# Patient Record
Sex: MALE | Race: WHITE | NOT HISPANIC OR LATINO | URBAN - METROPOLITAN AREA
[De-identification: names, ages, dates, MRNs, and addresses within clinical notes are randomized per-mention and may not be internally consistent; named-entity substitution may affect disease eponyms.]

---

## 2017-04-18 ENCOUNTER — ON CAMPUS - OUTPATIENT (AMBULATORY)
Dept: URBAN - METROPOLITAN AREA HOSPITAL 2 | Facility: HOSPITAL | Age: 72
End: 2017-04-18
Payer: MEDICARE

## 2017-04-18 ENCOUNTER — HOSPITAL ENCOUNTER (OUTPATIENT)
Dept: OTHER | Facility: HOSPITAL | Age: 72
Setting detail: SPECIMEN
Discharge: HOME | End: 2017-04-18
Attending: INTERNAL MEDICINE | Admitting: INTERNAL MEDICINE

## 2017-04-18 ENCOUNTER — OFFICE (AMBULATORY)
Dept: URBAN - METROPOLITAN AREA CLINIC 64 | Facility: CLINIC | Age: 72
End: 2017-04-18
Payer: MEDICARE

## 2017-04-18 VITALS
HEART RATE: 99 BPM | OXYGEN SATURATION: 98 % | RESPIRATION RATE: 15 BRPM | SYSTOLIC BLOOD PRESSURE: 95 MMHG | HEART RATE: 84 BPM | HEART RATE: 86 BPM | SYSTOLIC BLOOD PRESSURE: 127 MMHG | OXYGEN SATURATION: 96 % | DIASTOLIC BLOOD PRESSURE: 62 MMHG | OXYGEN SATURATION: 99 % | DIASTOLIC BLOOD PRESSURE: 78 MMHG | HEART RATE: 90 BPM | HEART RATE: 82 BPM | SYSTOLIC BLOOD PRESSURE: 143 MMHG | DIASTOLIC BLOOD PRESSURE: 71 MMHG | TEMPERATURE: 97 F | OXYGEN SATURATION: 95 % | DIASTOLIC BLOOD PRESSURE: 74 MMHG | HEART RATE: 101 BPM | HEART RATE: 93 BPM | DIASTOLIC BLOOD PRESSURE: 97 MMHG | DIASTOLIC BLOOD PRESSURE: 93 MMHG | OXYGEN SATURATION: 94 % | OXYGEN SATURATION: 97 % | SYSTOLIC BLOOD PRESSURE: 109 MMHG | SYSTOLIC BLOOD PRESSURE: 102 MMHG | RESPIRATION RATE: 18 BRPM | HEART RATE: 104 BPM | SYSTOLIC BLOOD PRESSURE: 136 MMHG | SYSTOLIC BLOOD PRESSURE: 113 MMHG | HEART RATE: 105 BPM | HEART RATE: 85 BPM | SYSTOLIC BLOOD PRESSURE: 98 MMHG | WEIGHT: 178 LBS | DIASTOLIC BLOOD PRESSURE: 67 MMHG | DIASTOLIC BLOOD PRESSURE: 39 MMHG | HEIGHT: 71 IN | SYSTOLIC BLOOD PRESSURE: 88 MMHG | HEART RATE: 100 BPM | OXYGEN SATURATION: 90 % | RESPIRATION RATE: 16 BRPM | DIASTOLIC BLOOD PRESSURE: 63 MMHG | HEART RATE: 117 BPM | SYSTOLIC BLOOD PRESSURE: 105 MMHG | DIASTOLIC BLOOD PRESSURE: 68 MMHG | SYSTOLIC BLOOD PRESSURE: 111 MMHG

## 2017-04-18 DIAGNOSIS — D12.5 BENIGN NEOPLASM OF SIGMOID COLON: ICD-10-CM

## 2017-04-18 DIAGNOSIS — D12.0 BENIGN NEOPLASM OF CECUM: ICD-10-CM

## 2017-04-18 DIAGNOSIS — D12.3 BENIGN NEOPLASM OF TRANSVERSE COLON: ICD-10-CM

## 2017-04-18 DIAGNOSIS — D12.2 BENIGN NEOPLASM OF ASCENDING COLON: ICD-10-CM

## 2017-04-18 DIAGNOSIS — Z86.010 PERSONAL HISTORY OF COLONIC POLYPS: ICD-10-CM

## 2017-04-18 LAB
GI HISTOLOGY: A. UNSPECIFIED: (no result)
GI HISTOLOGY: B. UNSPECIFIED: (no result)
GI HISTOLOGY: C. UNSPECIFIED: (no result)
GI HISTOLOGY: D. UNSPECIFIED: (no result)
GI HISTOLOGY: PDF REPORT: (no result)

## 2017-04-18 PROCEDURE — 45385 COLONOSCOPY W/LESION REMOVAL: CPT | Mod: PT | Performed by: INTERNAL MEDICINE

## 2017-04-18 PROCEDURE — 88305 TISSUE EXAM BY PATHOLOGIST: CPT | Mod: 26 | Performed by: INTERNAL MEDICINE

## 2017-04-18 RX ORDER — CALCIUM CARBONATE 500(1250)
500 TABLET ORAL
Qty: 30 | Refills: 1 | Status: COMPLETED
Start: 2017-04-18 | End: 2018-05-07

## 2017-04-18 RX ORDER — ASPIRIN 81 MG/1
81 TABLET, DELAYED RELEASE ORAL
Qty: 90 | Refills: 3 | Status: COMPLETED
Start: 2017-04-18 | End: 2018-05-07

## 2017-04-18 RX ADMIN — PROPOFOL: 10 INJECTION, EMULSION INTRAVENOUS at 07:47

## 2018-05-07 ENCOUNTER — HOSPITAL ENCOUNTER (OUTPATIENT)
Dept: OTHER | Facility: HOSPITAL | Age: 73
Setting detail: SPECIMEN
Discharge: HOME | End: 2018-05-07
Attending: INTERNAL MEDICINE | Admitting: INTERNAL MEDICINE

## 2018-05-07 ENCOUNTER — ON CAMPUS - OUTPATIENT (AMBULATORY)
Dept: URBAN - METROPOLITAN AREA HOSPITAL 2 | Facility: HOSPITAL | Age: 73
End: 2018-05-07
Payer: MEDICARE

## 2018-05-07 ENCOUNTER — OFFICE (AMBULATORY)
Dept: URBAN - METROPOLITAN AREA PATHOLOGY 4 | Facility: PATHOLOGY | Age: 73
End: 2018-05-07
Payer: MEDICARE

## 2018-05-07 VITALS
DIASTOLIC BLOOD PRESSURE: 56 MMHG | OXYGEN SATURATION: 95 % | WEIGHT: 181 LBS | HEART RATE: 97 BPM | SYSTOLIC BLOOD PRESSURE: 119 MMHG | HEART RATE: 100 BPM | SYSTOLIC BLOOD PRESSURE: 110 MMHG | HEART RATE: 98 BPM | RESPIRATION RATE: 16 BRPM | SYSTOLIC BLOOD PRESSURE: 86 MMHG | SYSTOLIC BLOOD PRESSURE: 116 MMHG | DIASTOLIC BLOOD PRESSURE: 93 MMHG | HEIGHT: 71 IN | RESPIRATION RATE: 18 BRPM | DIASTOLIC BLOOD PRESSURE: 53 MMHG | OXYGEN SATURATION: 97 % | DIASTOLIC BLOOD PRESSURE: 55 MMHG | HEART RATE: 90 BPM | SYSTOLIC BLOOD PRESSURE: 144 MMHG | SYSTOLIC BLOOD PRESSURE: 126 MMHG | HEART RATE: 92 BPM | RESPIRATION RATE: 15 BRPM | SYSTOLIC BLOOD PRESSURE: 84 MMHG | OXYGEN SATURATION: 96 % | TEMPERATURE: 97.2 F | DIASTOLIC BLOOD PRESSURE: 83 MMHG | OXYGEN SATURATION: 94 % | DIASTOLIC BLOOD PRESSURE: 92 MMHG | HEART RATE: 96 BPM | HEART RATE: 104 BPM | OXYGEN SATURATION: 92 % | DIASTOLIC BLOOD PRESSURE: 58 MMHG | DIASTOLIC BLOOD PRESSURE: 78 MMHG | DIASTOLIC BLOOD PRESSURE: 50 MMHG | HEART RATE: 89 BPM | SYSTOLIC BLOOD PRESSURE: 77 MMHG | HEART RATE: 111 BPM

## 2018-05-07 DIAGNOSIS — D12.3 BENIGN NEOPLASM OF TRANSVERSE COLON: ICD-10-CM

## 2018-05-07 DIAGNOSIS — D12.2 BENIGN NEOPLASM OF ASCENDING COLON: ICD-10-CM

## 2018-05-07 DIAGNOSIS — Z86.010 PERSONAL HISTORY OF COLONIC POLYPS: ICD-10-CM

## 2018-05-07 DIAGNOSIS — D12.5 BENIGN NEOPLASM OF SIGMOID COLON: ICD-10-CM

## 2018-05-07 DIAGNOSIS — K57.30 DIVERTICULOSIS OF LARGE INTESTINE WITHOUT PERFORATION OR ABS: ICD-10-CM

## 2018-05-07 LAB
GI HISTOLOGY: A. UNSPECIFIED: (no result)
GI HISTOLOGY: B. UNSPECIFIED: (no result)
GI HISTOLOGY: C. UNSPECIFIED: (no result)
GI HISTOLOGY: PDF REPORT: (no result)

## 2018-05-07 PROCEDURE — 45385 COLONOSCOPY W/LESION REMOVAL: CPT | Mod: PT | Performed by: INTERNAL MEDICINE

## 2018-05-07 PROCEDURE — 88305 TISSUE EXAM BY PATHOLOGIST: CPT | Mod: 26 | Performed by: INTERNAL MEDICINE

## 2018-05-07 RX ADMIN — PROPOFOL: 10 INJECTION, EMULSION INTRAVENOUS at 08:10

## 2018-05-07 NOTE — SERVICEHPINOTES
MEREDITH MENA  is a  73  male   who presents today for a  Colonoscopy   for   the indications listed below. The updated Patient Profile was reviewed prior to the procedure, in conjunction with the Physical Exam, including medical conditions, surgical procedures, medications, allergies, family history and social history. See Physical Exam time stamp below for date and time of HPI completion.Pre-operatively, I reviewed the indication(s) for the procedure, the risks of the procedure [including but not limited to: unexpected bleeding possibly requiring hospitalization and/or unplanned repeat procedures, perforation possibly requiring surgical treatment, missed lesions and complications of sedation/MAC (also explained by anesthesia staff)]. I have evaluated the patient for risks associated with the planned anesthesia and the procedure to be performed and find the patient an acceptable candidate for IV sedation.Multiple opportunities were provided for any questions or concerns, and all questions were answered satisfactorily before any anesthesia was administered. We will proceed with the planned procedure.BR

## 2020-05-08 ENCOUNTER — ON CAMPUS - OUTPATIENT (AMBULATORY)
Dept: URBAN - METROPOLITAN AREA HOSPITAL 2 | Facility: HOSPITAL | Age: 75
End: 2020-05-08
Payer: MEDICARE

## 2020-05-08 ENCOUNTER — OFFICE (AMBULATORY)
Dept: URBAN - METROPOLITAN AREA PATHOLOGY 4 | Facility: PATHOLOGY | Age: 75
End: 2020-05-08
Payer: COMMERCIAL

## 2020-05-08 ENCOUNTER — OFFICE (AMBULATORY)
Dept: URBAN - METROPOLITAN AREA PATHOLOGY 4 | Facility: PATHOLOGY | Age: 75
End: 2020-05-08
Payer: MEDICARE

## 2020-05-08 VITALS
DIASTOLIC BLOOD PRESSURE: 72 MMHG | HEART RATE: 82 BPM | SYSTOLIC BLOOD PRESSURE: 148 MMHG | DIASTOLIC BLOOD PRESSURE: 56 MMHG | SYSTOLIC BLOOD PRESSURE: 90 MMHG | HEART RATE: 76 BPM | DIASTOLIC BLOOD PRESSURE: 71 MMHG | HEART RATE: 81 BPM | OXYGEN SATURATION: 95 % | DIASTOLIC BLOOD PRESSURE: 60 MMHG | SYSTOLIC BLOOD PRESSURE: 107 MMHG | HEART RATE: 92 BPM | OXYGEN SATURATION: 98 % | HEART RATE: 97 BPM | HEART RATE: 90 BPM | RESPIRATION RATE: 18 BRPM | SYSTOLIC BLOOD PRESSURE: 103 MMHG | TEMPERATURE: 97.9 F | OXYGEN SATURATION: 96 % | RESPIRATION RATE: 16 BRPM | SYSTOLIC BLOOD PRESSURE: 142 MMHG | WEIGHT: 169.9 LBS | DIASTOLIC BLOOD PRESSURE: 98 MMHG | DIASTOLIC BLOOD PRESSURE: 96 MMHG | DIASTOLIC BLOOD PRESSURE: 57 MMHG | HEIGHT: 71 IN | OXYGEN SATURATION: 97 % | SYSTOLIC BLOOD PRESSURE: 86 MMHG | SYSTOLIC BLOOD PRESSURE: 95 MMHG

## 2020-05-08 DIAGNOSIS — Z86.010 PERSONAL HISTORY OF COLONIC POLYPS: ICD-10-CM

## 2020-05-08 DIAGNOSIS — K63.5 POLYP OF COLON: ICD-10-CM

## 2020-05-08 DIAGNOSIS — K57.30 DIVERTICULOSIS OF LARGE INTESTINE WITHOUT PERFORATION OR ABS: ICD-10-CM

## 2020-05-08 LAB
GI HISTOLOGY: A. UNSPECIFIED: (no result)
GI HISTOLOGY: PDF REPORT: (no result)

## 2020-05-08 PROCEDURE — 45385 COLONOSCOPY W/LESION REMOVAL: CPT | Mod: PT | Performed by: INTERNAL MEDICINE

## 2020-05-08 PROCEDURE — 88305 TISSUE EXAM BY PATHOLOGIST: CPT | Mod: 26 | Performed by: INTERNAL MEDICINE

## 2021-03-17 ENCOUNTER — TELEPHONE (OUTPATIENT)
Dept: CARDIOLOGY | Facility: CLINIC | Age: 76
End: 2021-03-17

## 2021-03-17 NOTE — TELEPHONE ENCOUNTER
SPOKE TO SHANNON GILES'S OFFICE. SHE IS WORKING ON [employer] FOR INSURANCE.  PATIENT HAS [employer] O. SHE WILL CALL BACK ONCE SHE HAS IT SO WE CAN MAKE APT FOR PATIENT.

## 2021-04-22 ENCOUNTER — OFFICE VISIT (OUTPATIENT)
Dept: CARDIOLOGY | Facility: CLINIC | Age: 76
End: 2021-04-22

## 2021-04-22 VITALS
HEART RATE: 90 BPM | BODY MASS INDEX: 25.2 KG/M2 | HEIGHT: 70 IN | DIASTOLIC BLOOD PRESSURE: 70 MMHG | WEIGHT: 176 LBS | SYSTOLIC BLOOD PRESSURE: 140 MMHG

## 2021-04-22 DIAGNOSIS — Z86.718 HISTORY OF DVT (DEEP VEIN THROMBOSIS): ICD-10-CM

## 2021-04-22 DIAGNOSIS — R00.2 PALPITATIONS: Primary | ICD-10-CM

## 2021-04-22 DIAGNOSIS — R01.1 HEART MURMUR: ICD-10-CM

## 2021-04-22 PROCEDURE — 99204 OFFICE O/P NEW MOD 45 MIN: CPT | Performed by: INTERNAL MEDICINE

## 2021-04-22 PROCEDURE — 93000 ELECTROCARDIOGRAM COMPLETE: CPT | Performed by: INTERNAL MEDICINE

## 2021-04-22 RX ORDER — ALLOPURINOL 100 MG/1
100 TABLET ORAL DAILY
COMMUNITY
Start: 2021-03-31

## 2021-04-22 RX ORDER — MULTIPLE VITAMINS W/ MINERALS TAB 9MG-400MCG
1 TAB ORAL DAILY
COMMUNITY
End: 2021-07-19

## 2021-04-22 RX ORDER — ASPIRIN 81 MG/1
81 TABLET ORAL DAILY
COMMUNITY
End: 2021-07-19

## 2021-05-19 ENCOUNTER — HOSPITAL ENCOUNTER (OUTPATIENT)
Dept: CARDIOLOGY | Facility: HOSPITAL | Age: 76
Discharge: HOME | End: 2021-05-19
Admitting: INTERNAL MEDICINE

## 2021-05-19 VITALS
BODY MASS INDEX: 25.2 KG/M2 | WEIGHT: 176 LBS | HEIGHT: 70 IN | SYSTOLIC BLOOD PRESSURE: 140 MMHG | OXYGEN SATURATION: 95 % | DIASTOLIC BLOOD PRESSURE: 84 MMHG | HEART RATE: 89 BPM

## 2021-05-19 DIAGNOSIS — R00.2 PALPITATIONS: ICD-10-CM

## 2021-05-19 DIAGNOSIS — R01.1 HEART MURMUR: ICD-10-CM

## 2021-05-19 PROCEDURE — 93306 TTE W/DOPPLER COMPLETE: CPT

## 2021-05-19 PROCEDURE — 93306 TTE W/DOPPLER COMPLETE: CPT | Performed by: INTERNAL MEDICINE

## 2021-05-20 LAB
AORTIC ARCH: 2.9 CM
AORTIC DIMENSIONLESS INDEX: 0.4 (DI)
ASCENDING AORTA: 3.6 CM
BH CV ECHO MEAS - ACS: 1.2 CM
BH CV ECHO MEAS - AO MAX PG (FULL): 24.3 MMHG
BH CV ECHO MEAS - AO MAX PG: 28.2 MMHG
BH CV ECHO MEAS - AO MEAN PG (FULL): 12.5 MMHG
BH CV ECHO MEAS - AO MEAN PG: 14.9 MMHG
BH CV ECHO MEAS - AO ROOT AREA (BSA CORRECTED): 1.6
BH CV ECHO MEAS - AO ROOT AREA: 8.2 CM^2
BH CV ECHO MEAS - AO ROOT DIAM: 3.2 CM
BH CV ECHO MEAS - AO V2 MAX: 265.4 CM/SEC
BH CV ECHO MEAS - AO V2 MEAN: 188.9 CM/SEC
BH CV ECHO MEAS - AO V2 VTI: 54.8 CM
BH CV ECHO MEAS - ASC AORTA: 3.6 CM
BH CV ECHO MEAS - AVA(I,A): 1.8 CM^2
BH CV ECHO MEAS - AVA(I,D): 1.8 CM^2
BH CV ECHO MEAS - AVA(V,A): 1.7 CM^2
BH CV ECHO MEAS - AVA(V,D): 1.7 CM^2
BH CV ECHO MEAS - BSA(HAYCOCK): 2 M^2
BH CV ECHO MEAS - BSA: 2 M^2
BH CV ECHO MEAS - BZI_BMI: 25.3 KILOGRAMS/M^2
BH CV ECHO MEAS - BZI_METRIC_HEIGHT: 177.8 CM
BH CV ECHO MEAS - BZI_METRIC_WEIGHT: 79.8 KG
BH CV ECHO MEAS - EDV(MOD-SP2): 60 ML
BH CV ECHO MEAS - EDV(MOD-SP4): 70 ML
BH CV ECHO MEAS - EDV(TEICH): 72.3 ML
BH CV ECHO MEAS - EF(CUBED): 77.7 %
BH CV ECHO MEAS - EF(MOD-BP): 63.7 %
BH CV ECHO MEAS - EF(MOD-SP2): 61.7 %
BH CV ECHO MEAS - EF(MOD-SP4): 64.3 %
BH CV ECHO MEAS - EF(TEICH): 70.4 %
BH CV ECHO MEAS - ESV(MOD-SP2): 23 ML
BH CV ECHO MEAS - ESV(MOD-SP4): 25 ML
BH CV ECHO MEAS - ESV(TEICH): 21.4 ML
BH CV ECHO MEAS - FS: 39.4 %
BH CV ECHO MEAS - IVS/LVPW: 0.95
BH CV ECHO MEAS - IVSD: 1 CM
BH CV ECHO MEAS - LAT PEAK E' VEL: 6 CM/SEC
BH CV ECHO MEAS - LV DIASTOLIC VOL/BSA (35-75): 35.4 ML/M^2
BH CV ECHO MEAS - LV MASS(C)D: 133.8 GRAMS
BH CV ECHO MEAS - LV MASS(C)DI: 67.7 GRAMS/M^2
BH CV ECHO MEAS - LV MAX PG: 3.9 MMHG
BH CV ECHO MEAS - LV MEAN PG: 2.4 MMHG
BH CV ECHO MEAS - LV SYSTOLIC VOL/BSA (12-30): 12.6 ML/M^2
BH CV ECHO MEAS - LV V1 MAX: 98.5 CM/SEC
BH CV ECHO MEAS - LV V1 MEAN: 72.4 CM/SEC
BH CV ECHO MEAS - LV V1 VTI: 21.4 CM
BH CV ECHO MEAS - LVIDD: 4.1 CM
BH CV ECHO MEAS - LVIDS: 2.5 CM
BH CV ECHO MEAS - LVLD AP2: 6.1 CM
BH CV ECHO MEAS - LVLD AP4: 6.7 CM
BH CV ECHO MEAS - LVLS AP2: 5.5 CM
BH CV ECHO MEAS - LVLS AP4: 5.5 CM
BH CV ECHO MEAS - LVOT AREA (M): 4.5 CM^2
BH CV ECHO MEAS - LVOT AREA: 4.5 CM^2
BH CV ECHO MEAS - LVOT DIAM: 2.4 CM
BH CV ECHO MEAS - LVPWD: 1 CM
BH CV ECHO MEAS - MED PEAK E' VEL: 6.4 CM/SEC
BH CV ECHO MEAS - MV A DUR: 0.09 SEC
BH CV ECHO MEAS - MV A MAX VEL: 72.2 CM/SEC
BH CV ECHO MEAS - MV DEC SLOPE: 469.2 CM/SEC^2
BH CV ECHO MEAS - MV DEC TIME: 0.2 SEC
BH CV ECHO MEAS - MV E MAX VEL: 94.6 CM/SEC
BH CV ECHO MEAS - MV E/A: 1.3
BH CV ECHO MEAS - MV MAX PG: 3.3 MMHG
BH CV ECHO MEAS - MV MEAN PG: 2 MMHG
BH CV ECHO MEAS - MV P1/2T MAX VEL: 91.1 CM/SEC
BH CV ECHO MEAS - MV P1/2T: 56.9 MSEC
BH CV ECHO MEAS - MV V2 MAX: 91.1 CM/SEC
BH CV ECHO MEAS - MV V2 MEAN: 68.1 CM/SEC
BH CV ECHO MEAS - MV V2 VTI: 25.9 CM
BH CV ECHO MEAS - MVA P1/2T LCG: 2.4 CM^2
BH CV ECHO MEAS - MVA(P1/2T): 3.9 CM^2
BH CV ECHO MEAS - MVA(VTI): 3.7 CM^2
BH CV ECHO MEAS - PA ACC TIME: 0.07 SEC
BH CV ECHO MEAS - PA MAX PG (FULL): 4.8 MMHG
BH CV ECHO MEAS - PA MAX PG: 6.7 MMHG
BH CV ECHO MEAS - PA PR(ACCEL): 45.7 MMHG
BH CV ECHO MEAS - PA V2 MAX: 129 CM/SEC
BH CV ECHO MEAS - PULM A REVS DUR: 0.09 SEC
BH CV ECHO MEAS - PULM A REVS VEL: 32.6 CM/SEC
BH CV ECHO MEAS - PULM DIAS VEL: 54.8 CM/SEC
BH CV ECHO MEAS - PULM S/D: 1.1
BH CV ECHO MEAS - PULM SYS VEL: 60.8 CM/SEC
BH CV ECHO MEAS - PVA(V,A): 1.8 CM^2
BH CV ECHO MEAS - PVA(V,D): 1.8 CM^2
BH CV ECHO MEAS - QP/QS: 0.62
BH CV ECHO MEAS - RAP SYSTOLE: 3 MMHG
BH CV ECHO MEAS - RV MAX PG: 1.9 MMHG
BH CV ECHO MEAS - RV MEAN PG: 1.3 MMHG
BH CV ECHO MEAS - RV V1 MAX: 68.6 CM/SEC
BH CV ECHO MEAS - RV V1 MEAN: 54.4 CM/SEC
BH CV ECHO MEAS - RV V1 VTI: 17.4 CM
BH CV ECHO MEAS - RVOT AREA: 3.4 CM^2
BH CV ECHO MEAS - RVOT DIAM: 2.1 CM
BH CV ECHO MEAS - RVSP: 23.5 MMHG
BH CV ECHO MEAS - SI(AO): 226.7 ML/M^2
BH CV ECHO MEAS - SI(CUBED): 26.2 ML/M^2
BH CV ECHO MEAS - SI(LVOT): 48.8 ML/M^2
BH CV ECHO MEAS - SI(MOD-SP2): 18.7 ML/M^2
BH CV ECHO MEAS - SI(MOD-SP4): 22.8 ML/M^2
BH CV ECHO MEAS - SI(TEICH): 25.8 ML/M^2
BH CV ECHO MEAS - SV(AO): 448.1 ML
BH CV ECHO MEAS - SV(CUBED): 51.8 ML
BH CV ECHO MEAS - SV(LVOT): 96.4 ML
BH CV ECHO MEAS - SV(MOD-SP2): 37 ML
BH CV ECHO MEAS - SV(MOD-SP4): 45 ML
BH CV ECHO MEAS - SV(RVOT): 60 ML
BH CV ECHO MEAS - SV(TEICH): 50.9 ML
BH CV ECHO MEAS - TAPSE (>1.6): 1.8 CM
BH CV ECHO MEAS - TR MAX VEL: 226.2 CM/SEC
BH CV ECHO MEASUREMENTS AVERAGE E/E' RATIO: 15.26
BH CV VAS BP RIGHT ARM: NORMAL MMHG
BH CV XLRA - RV BASE: 2.4 CM
BH CV XLRA - RV LENGTH: 6.9 CM
BH CV XLRA - RV MID: 2.9 CM
BH CV XLRA - TDI S': 10.7 CM/SEC
LEFT ATRIUM VOLUME INDEX: 33 ML/M2
LV EF 2D ECHO EST: 65 %
MAXIMAL PREDICTED HEART RATE: 145 BPM
SINUS: 3.4 CM
STJ: 3.6 CM
STRESS TARGET HR: 123 BPM

## 2021-05-27 ENCOUNTER — TELEPHONE (OUTPATIENT)
Dept: CARDIOLOGY | Facility: CLINIC | Age: 76
End: 2021-05-27

## 2021-05-27 NOTE — TELEPHONE ENCOUNTER
Called patient no answer left voicemail to let him know that I am sitting some samples up front ready for him to pick. I am only able to give two boxes at  a time.

## 2021-05-27 NOTE — TELEPHONE ENCOUNTER
Called patient and discussed event monitor results which did identify paroxysmal atrial flutter on 5/25/2021.  Patient did have some mild chest discomfort during this time but was not as severe as other palpitations.  This likely explains some of his palpitations.  I have asked him to start metoprolol 25 mg twice daily to have called into the pharmacy and have also called in Eliquis 5 mg twice daily.

## 2021-07-12 ENCOUNTER — OFFICE VISIT (OUTPATIENT)
Dept: ORTHOPEDIC SURGERY | Facility: CLINIC | Age: 76
End: 2021-07-12

## 2021-07-12 VITALS — WEIGHT: 178 LBS | BODY MASS INDEX: 24.92 KG/M2 | HEIGHT: 71 IN | TEMPERATURE: 97.3 F

## 2021-07-12 DIAGNOSIS — M79.671 FOOT PAIN, RIGHT: Primary | ICD-10-CM

## 2021-07-12 DIAGNOSIS — M77.51 CALCANEAL BURSITIS (HEEL), RIGHT: ICD-10-CM

## 2021-07-12 DIAGNOSIS — M19.071 DJD (DEGENERATIVE JOINT DISEASE), ANKLE AND FOOT, RIGHT: ICD-10-CM

## 2021-07-12 PROCEDURE — 73630 X-RAY EXAM OF FOOT: CPT | Performed by: NURSE PRACTITIONER

## 2021-07-12 PROCEDURE — 99202 OFFICE O/P NEW SF 15 MIN: CPT | Performed by: NURSE PRACTITIONER

## 2021-07-12 RX ORDER — COLCHICINE 0.6 MG/1
TABLET ORAL
COMMUNITY
Start: 2021-05-17

## 2021-07-12 NOTE — PROGRESS NOTES
Patient Name: Aniket Ventura   YOB: 1945  Referring Primary Care Physician: Hector Felder MD  BMI: Body mass index is 24.83 kg/m².    Chief Complaint:    Chief Complaint   Patient presents with   • Right Foot - Pain        HPI: New patient to me presents with heel pain.  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  Denies any history of injury or trauma in the past or fracture.    Aniket Ventura is a 76 y.o. male who presents today for evaluation of   Chief Complaint   Patient presents with   • Right Foot - Pain         Subjective   Medications:   Home Medications:  Current Outpatient Medications on File Prior to Visit   Medication Sig   • allopurinol (ZYLOPRIM) 100 MG tablet Take 100 mg by mouth Daily.   • apixaban (ELIQUIS) 5 MG tablet tablet Take 1 tablet by mouth Every 12 (Twelve) Hours.   • metoprolol tartrate (LOPRESSOR) 25 MG tablet Take 1 tablet by mouth 2 (Two) Times a Day.   • aspirin 81 MG EC tablet Take 81 mg by mouth Daily.   • colchicine 0.6 MG tablet PRN gout flare   • Ferrous Sulfate (IRON PO) Take  by mouth.   • multivitamin with minerals tablet tablet Take 1 tablet by mouth Daily.     No current facility-administered medications on file prior to visit.     Current Medications:  Scheduled Meds:  Continuous Infusions:No current facility-administered medications for this visit.    PRN Meds:.    I have reviewed the patient's medical history in detail and updated the computerized patient record.  Review and summarization of old records includes:    History reviewed. No pertinent past medical history.     Past Surgical History:   Procedure Laterality Date   • HERNIA REPAIR      x2        Social History     Occupational History   • Not on file   Tobacco Use   • Smoking status: Former Smoker     Packs/day:  "1.00     Years: 30.00     Pack years: 30.00   • Smokeless tobacco: Former User   • Tobacco comment: quit 25 years ago   Substance and Sexual Activity   • Alcohol use: Not Currently   • Drug use: Not Currently   • Sexual activity: Not on file      Social History     Social History Narrative   • Not on file        Family History   Problem Relation Age of Onset   • Heart disease Father    • Stroke Father    • Diabetes Brother    • Colon cancer Brother        ROS: 14 point review of systems was performed and all other systems were reviewed and are negative except for documented findings in HPI and today's encounter.     Allergies: No Known Allergies  Constitutional:  Denies fever, shaking or chills   Eyes:  Denies change in visual acuity   HENT:  Denies nasal congestion or sore throat   Respiratory:  Denies cough or shortness of breath   Cardiovascular:  Denies chest pain or severe LE edema   GI:  Denies abdominal pain, nausea, vomiting, bloody stools or diarrhea   Musculoskeletal:  Numbness, tingling, pain, or loss of motor function only as noted above in history of present illness.  : Denies painful urination or hematuria  Integument:  Denies rash, lesion or ulceration   Neurologic:  Denies headache or focal weakness  Endocrine:  Denies lymphadenopathy  Psych:  Denies confusion or change in mental status   Hem:  Denies active bleeding    OBJECTIVE:  Physical Exam: 76 y.o. male  Wt Readings from Last 3 Encounters:   07/12/21 80.7 kg (178 lb)   05/19/21 79.8 kg (176 lb)   04/22/21 79.8 kg (176 lb)     Ht Readings from Last 1 Encounters:   07/12/21 180.3 cm (71\")     Body mass index is 24.83 kg/m².  Vitals:    07/12/21 1334   Temp: 97.3 °F (36.3 °C)     Vital signs reviewed.     General Appearance:    Alert, cooperative, in no acute distress                  Eyes: conjunctiva clear  ENT: external ears and nose atraumatic  CV: no peripheral edema  Resp: normal respiratory effort  Skin: no rashes or wounds; normal " turgor  Psych: mood and affect appropriate  Lymph: no nodes appreciated  Neuro: gross sensation intact  Vascular:  Palpable peripheral pulse in noted extremity  Musculoskeletal Extremities: Skin is warm dry intact with good pulses movement and sensation is point tender over the calcaneal bursa compartments are soft skin is intact capillary refill is intact posterior tibial tendon is okay Achilles tendon is intact calf is soft and nontender base of fifth metatarsals nontender to palpation    Radiology:   Right foot 3 views done for pain no comparison films no acute fractures and degenerative changes noted no spurs noted        Assessment:     ICD-10-CM ICD-9-CM   1. Foot pain, right  M79.671 729.5   2. Calcaneal bursitis (heel), right  M77.51 726.79   3. DJD (degenerative joint disease), ankle and foot, right  M19.071 715.97        MDM/Plan:   The diagnosis(es), natural history, pathophysiology and treatment for diagnosis(es) were discussed. Opportunity given and questions answered.  Biomechanics of pertinent body areas discussed.  When appropriate, the use of ambulatory aids discussed.    The diagnosis(es), natural history, pathophysiology and treatment for diagnosis(es) were discussed. Opportunity given and questions answered.  Biomechanics of pertinent body areas discussed.  When appropriate, the use of ambulatory aids discussed.  EXERCISES:  Advice on benefits of, and types of regular/moderate exercise pertaining to orthopedic diagnosis(es).  MEDICATIONS:  The risks, benefits, warnings,side effects and alternatives of medications discussed.  Inflammation/pain control; with cold, heat, elevation and/or liniments discussed as appropriate  HOME EXERCISE/PT program encouraged  MEDICAL RECORDS reviewed from other provider(s) for past and current medical history pertinent to this complaint.  Will immobilize him and see if we get the inflammation and swelling out and have him follow-up with sports medicine or one of our  doctors in [place] if he wants to go to [place]    7/12/2021    Much of this encounter note is an electronic transcription/translation of spoken language to printed text. The electronic translation of spoken language may permit erroneous, or at times, nonsensical words or phrases to be inadvertently transcribed; Although I have reviewed the note for such errors, some may still exist

## 2021-07-19 ENCOUNTER — OFFICE VISIT (OUTPATIENT)
Dept: CARDIOLOGY | Facility: CLINIC | Age: 76
End: 2021-07-19

## 2021-07-19 VITALS
WEIGHT: 174.6 LBS | SYSTOLIC BLOOD PRESSURE: 130 MMHG | HEIGHT: 71 IN | DIASTOLIC BLOOD PRESSURE: 80 MMHG | BODY MASS INDEX: 24.44 KG/M2 | HEART RATE: 67 BPM

## 2021-07-19 DIAGNOSIS — I48.0 PAROXYSMAL ATRIAL FIBRILLATION (HCC): ICD-10-CM

## 2021-07-19 DIAGNOSIS — Z86.718 HISTORY OF DVT (DEEP VEIN THROMBOSIS): ICD-10-CM

## 2021-07-19 DIAGNOSIS — I35.0 NONRHEUMATIC AORTIC VALVE STENOSIS: Primary | ICD-10-CM

## 2021-07-19 PROCEDURE — 93000 ELECTROCARDIOGRAM COMPLETE: CPT | Performed by: INTERNAL MEDICINE

## 2021-07-19 PROCEDURE — 99214 OFFICE O/P EST MOD 30 MIN: CPT | Performed by: INTERNAL MEDICINE

## 2021-07-19 NOTE — PROGRESS NOTES
[facility] Follow Up Office Note     Encounter Date:21  Patient:Aniket Ventura  :1945  MRN:[id number]      Chief Complaint: Follow up Palpitations  Chief Complaint   Patient presents with   • Palpitations     2 month f/u   • Heart Murmur   • H/O DVT     History of Presenting Illness:      Mr. Ventura is a 76 y.o. gentleman with past medical history notable for provoked DVT in the setting of knee surgery who presents to our office for follow up evaluation regarding episodes of palpitations and lightheadedness.  After our last visit we were able to identify paroxysmal atrial flutter on the monitor.  He was initiated on beta-blocker and anticoagulation given high risk of stroke.  He has tolerated initiating both of these medications without any significant side effects.  His only complaint is the cost of Eliquis.    Additionally work-up for his heart murmur identified mild aortic stenoses.    Review of Systems:  Review of Systems   Constitutional: Negative.   HENT: Negative.    Eyes: Negative.    Cardiovascular: Negative.    Respiratory: Negative.    Endocrine: Negative.    Hematologic/Lymphatic: Negative.    Skin: Negative.    Musculoskeletal: Negative.    Gastrointestinal: Negative.    Genitourinary: Negative.    Neurological: Negative.    Psychiatric/Behavioral: Negative.    Allergic/Immunologic: Negative.        Current Outpatient Medications on File Prior to Visit   Medication Sig Dispense Refill   • allopurinol (ZYLOPRIM) 100 MG tablet Take 100 mg by mouth Daily.     • apixaban (ELIQUIS) 5 MG tablet tablet Take 1 tablet by mouth Every 12 (Twelve) Hours. 180 tablet 3   • colchicine 0.6 MG tablet PRN gout flare     • metoprolol tartrate (LOPRESSOR) 25 MG tablet Take 1 tablet by mouth 2 (Two) Times a Day. 180 tablet 3   • [DISCONTINUED] aspirin 81 MG EC tablet Take 81 mg by mouth Daily.     • [DISCONTINUED] Ferrous Sulfate (IRON PO) Take  by mouth.     • [DISCONTINUED] multivitamin with  "minerals tablet tablet Take 1 tablet by mouth Daily.       No current facility-administered medications on file prior to visit.       No Known Allergies    History reviewed. No pertinent past medical history.    Past Surgical History:   Procedure Laterality Date   • HERNIA REPAIR      x2       Social History     Socioeconomic History   • Marital status:      Spouse name: Not on file   • Number of children: Not on file   • Years of education: Not on file   • Highest education level: Not on file   Tobacco Use   • Smoking status: Former Smoker     Packs/day: 1.00     Years: 30.00     Pack years: 30.00   • Smokeless tobacco: Former User   • Tobacco comment: quit 25 years ago   Substance and Sexual Activity   • Alcohol use: Not Currently   • Drug use: Not Currently       Family History   Problem Relation Age of Onset   • Heart disease Father    • Stroke Father    • Diabetes Brother    • Colon cancer Brother        The following portions of the patient's history were reviewed and updated as appropriate: allergies, current medications, past family history, past medical history, past social history, past surgical history and problem list.       Objective:       Vitals:    07/19/21 0755   BP: 130/80   BP Location: Left arm   Patient Position: Sitting   Pulse: 67   Weight: 79.2 kg (174 lb 9.6 oz)   Height: 180.3 cm (71\")     Body mass index is 24.35 kg/m².     Physical Exam:  Constitutional: Well appearing, well developed, no acute distress   HENT: Oropharynx clear and membrane moist  Eyes: Normal conjunctiva, no sclera icterus.  Neck: Supple, no carotid bruit bilaterally.  Cardiovascular: Regular rate and rhythm, Early peaking systolic murmur over the right upper sternal border, No bilateral lower extremity edema.  Pulmonary: Normal respiratory effort, normal lung sounds, no wheezing.  Abdominal: Soft, nontender, no hepatosplenomegaly, liver is non-pulsatile.  Neurological: Alert and orient x 3.   Skin: Warm, dry, no " ecchymosis, no rash.  Psych: Appropriate mood and affect. Normal judgment and insight.      Lab Results   Component Value Date     01/13/2020     05/08/2019    K 4.4 01/13/2020    K 4.5 05/08/2019     01/13/2020     05/08/2019    CO2 22 01/13/2020    CO2 26 05/08/2019    BUN 12 01/13/2020    BUN 13 05/08/2019    CREATININE 0.8 01/13/2020    CREATININE 0.9 05/08/2019    CALCIUM 8.7 01/13/2020    CALCIUM 9.1 05/08/2019    ALBUMIN 3.9 01/13/2020    ALBUMIN 4.3 05/08/2019    BILITOT 0.4 01/13/2020    BILITOT 0.2 05/08/2019    AST 20 01/13/2020    AST 23 05/08/2019    ALT 18 01/13/2020    ALT 22 05/08/2019     Lab Results   Component Value Date    WBC 7.64 08/26/2019    WBC 7.2 05/20/2019    HGB 15.9 08/26/2019    HGB 13.7 05/20/2019    HCT 49.3 08/26/2019    HCT 49.0 08/26/2019    MCV 89.6 08/26/2019    MCV 82.7 05/20/2019     08/26/2019     05/20/2019     Lab Results   Component Value Date    TRIG 70 01/13/2020    TRIG 67 01/16/2019    HDL 35 (L) 01/13/2020    HDL 39 (L) 01/16/2019    LDL 88 01/13/2020     (H) 01/16/2019     No results found for: PROBNP, BNP  No results found for: CKTOTAL, CKMB, CKMBINDEX, TROPONINI, TROPONINT  No results found for: TSH      CMP 1/13/2021:  Sodium 141  Potassium 4.2  Chloride 103  CO2 22  BUN 11  Creatinine 0.95  Alk phos 199  AST 21  ALT 21    CBC 1/12/2021:  WBC 5.8  Hemoglobin 15.2  Hematocrit 48.8  Platelets 279    Lipid panel 1/12/2021:  Total cholesterol 136  Triglycerides 68  HDL 31  LDL 91    TSH 10/9/2020:  1.9      ECG 12 Lead    Date/Time: 7/19/2021 8:18 AM  Performed by: Ramy Stoner MD  Authorized by: Ramy Stoner MD   Comparison: compared with previous ECG from 4/22/2021  Rhythm: sinus rhythm    Clinical impression: normal ECG        2 Week Event Monitor 6/2/2021 with image reviewed by myself:  · An abnormal monitor study.  · Underlying heart rhythm was sinus rhythm  · One episode of atrial flutter  noted.    Echocardiogram 5/19/2021 with images reviewed by myself:  · Estimated left ventricular EF = 65% Left ventricular systolic function is normal.  · Left ventricular diastolic function was normal.  · Mild aortic valve stenosis is present. Aortic valve area is 1.8 cm2. Aortic valve maximum pressure gradient is 28.2 mmHg. Aortic valve mean pressure gradient is 14.9 mmHg.  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 Nonrheumatic aortic valve stenosis  ECG 12 Lead   2. History of DVT (deep vein thrombosis)     3. Paroxysmal atrial fibrillation (CMS/[facility])            Plan:       Mr. Ventura is a 76 y.o. gentleman with past medical history notable for provoked DVT in the setting of knee surgery who presents to our office for scheduled follow up evaluation regarding episodes of palpitations and lightheadedness.  Overall patient is doing well.  No changes needed this time.  We will plan on seeing her back in 6 months.    Paroxysmal atrial flutter:  · Identified on event monitor 6/2021  · Continue anticoagulation  · Continue metoprolol    Aortic stenosis:  · Mild on echo 5/2021  · Will follow on serial echo    History of DVT:  · DVT was provoked in the setting of knee surgery  · Continue anticoagulation for paroxysmal atrial fibrillation    Follow-up:  6 months    Thank you for allowing me to participate in the care of Aniket Ventura. Feel free to contact me directly with any further questions or concerns.    Ramy Stoner MD  [facility]  07/19/21  08:19 EDT

## 2022-08-03 ENCOUNTER — TELEPHONE (OUTPATIENT)
Dept: CARDIOLOGY | Facility: CLINIC | Age: 77
End: 2022-08-03

## 2022-08-03 NOTE — TELEPHONE ENCOUNTER
Pt called stating that he will be getting a tooth extracted. He would like to know, how many days prior can he hold his Eliquis?    He can be reached at [phone]    Thanks

## 2022-11-16 NOTE — PROGRESS NOTES
"Physical Therapy    Visit Type: treatment  Precautions:  Medical precautions:  fall risk; contact precautions and standard precautions. Tex Kong is a 62year old male with complex medical history. He sustained a Left femur fracture after a fall on 06/02/22 and was admitted to [address] [place] [address] post Left total hip arthroplasty on 06/08/22. Patient now returns to [facility] ED via EMS for another fall in which he apparently became ""shaky\"" and fell against a wall. Upon arrival, EMS found him to be hypoglycemic. Work-up in the ED revealed hypokalemia, AUSTIN and abnormal radiographs showing acute proximal Left femur fracture. Trauma Surgery was consulted. On 11/4/2022 he underwent a ORIF of left periprosthetic femur fracture with revision of femoral component of left total hip arthroplasty. He is now TTWB on L LE with posterior hip precautions. At baseline, pt resides in an apartment, alone with no stairs where he reports using a RW for functional mobility. Lines:     Basic: telemetry and external urinary catheter      Lines in chart and on patient reviewed, precautions maintained throughout session. Lower Extremity:    Left:  weight bearing: toe touch. hip - posterior precautions      Hip abductor pillow in place while in bed  Safety Measures: bed alarm and bed rails  SUBJECTIVE  Patient agreed to participate in therapy this date. Pt reported feeling dizzy during session.    Patient / Family Goal: maximize function     OBJECTIVE      Vitals:  Blood Pressure (mmhg):      - Supine: 102/65     - Seated: 124/67  117/68 seated post standing trial       Sitting Balance  (HERNÁN = base of support)  Static      - Trial 1 details: contact guard  R lean noted       Bed Mobility  - Supine to sit: moderate assist, 2 persons  - Sit to supine: moderate assist, 2 persons  Transfers  Assistive devices: 2-wheeled walker  - Sit to stand: moderate assist, maximal assist, with verbal cues  - Stand to sit: moderate assist, with " [facility] New Patient Office Note     Encounter Date:21  Patient:Aniket Ventura  :1945  MRN:[id number]    Referring Provider: Hector Felder MD    Consulted for: Palpitations    Chief Complaint:   Chief Complaint   Patient presents with   • Fatigue   • Dizziness   • Palpitations     History of Presenting Illness:      Mr. Ventura is a 76 y.o. gentleman with past medical history notable for provoked DVT in the setting of knee surgery who presents to our office for initial valuation regarding episodes of palpitations and lightheadedness.  General patient has been doing well from a cardiac standpoint.  He occasionally has episodes of palpitations.  These are fairly infrequent in nature.  Sometimes will go 6 months without ever having an episode however over the last month he has had 2 episodes.  One episode lasted for about 15 minutes but was severe associated with lightheadedness and dizziness.  He had to stop the car and actually had his wife drive the rest the way.  He did not have any loss of consciousness or any presyncopal or syncopal episodes.  The episode was not particularly provoked.  Associated with heart racing and resolved on its own.  He did notice a second episode this week which was also brief in duration associated with bending over and did have some lightheadedness and dizziness with this.  Given these recurrent episodes he did talk to his primary care physician who appropriately ordered lab work but also wanted him to see a cardiologist given a newly diagnosed heart murmur.      Review of Systems:  Review of Systems   Constitutional: Negative.   HENT: Negative.    Eyes: Negative.    Cardiovascular: Positive for palpitations.   Respiratory: Negative.    Endocrine: Negative.    Hematologic/Lymphatic: Negative.    Skin: Negative.    Musculoskeletal: Negative.    Gastrointestinal: Negative.    Genitourinary: Negative.    Neurological: Positive for light-headedness.  "  Psychiatric/Behavioral: Negative.    Allergic/Immunologic: Negative.        Current Outpatient Medications on File Prior to Visit   Medication Sig Dispense Refill   • allopurinol (ZYLOPRIM) 100 MG tablet Take 100 mg by mouth Daily.     • aspirin 81 MG EC tablet Take 81 mg by mouth Daily.     • Ferrous Sulfate (IRON PO) Take  by mouth.     • multivitamin with minerals tablet tablet Take 1 tablet by mouth Daily.       No current facility-administered medications on file prior to visit.       No Known Allergies    History reviewed. No pertinent past medical history.    Past Surgical History:   Procedure Laterality Date   • HERNIA REPAIR      x2       Social History     Socioeconomic History   • Marital status:      Spouse name: Not on file   • Number of children: Not on file   • Years of education: Not on file   • Highest education level: Not on file   Tobacco Use   • Smoking status: Former Smoker     Packs/day: 1.00     Years: 30.00     Pack years: 30.00   • Smokeless tobacco: Former User   • Tobacco comment: quit 25 years ago   Substance and Sexual Activity   • Alcohol use: Not Currently   • Drug use: Not Currently       Family History   Problem Relation Age of Onset   • Heart disease Father    • Stroke Father    • Diabetes Brother    • Colon cancer Brother        The following portions of the patient's history were reviewed and updated as appropriate: allergies, current medications, past family history, past medical history, past social history, past surgical history and problem list.       Objective:       Vitals:    04/22/21 0823   BP: 140/70   BP Location: Right arm   Patient Position: Sitting   Pulse: 90   Weight: 79.8 kg (176 lb)   Height: 177.8 cm (70\")     Body mass index is 25.25 kg/m².     Physical Exam:  Constitutional: Well appearing, well developed, no acute distress   HENT: Oropharynx clear and membrane moist  Eyes: Normal conjunctiva, no sclera icterus.  Neck: Supple, no carotid bruit " verbal cues    Ambulation / Gait  - Assist Level: not attempted due to not medically appropriate or safe        Interventions     Supine    Lower Extremity: Bilateral: gluteus sets and quad sets, AROM, 10 reps, 1 sets  Training provided: bed mobility training, positioning, transfer training, safety training and use of assistive device    Skilled input: Verbal instruction/cues and tactile instruction/cues  Verbal Consent: Writer verbally educated and received verbal consent for hand placement, positioning of patient, and techniques to be performed today from patient for clothing adjustments for techniques, hand placement and palpation for techniques and therapist position for techniques as described above and how they are pertinent to the patient's plan of care. ASSESSMENT   Impairments: activity tolerance, balance, desire/motivation, pain and strength  Functional Limitations: all functional mobility  Pt cleared for participation in PT session by RN. First attempt pt deferred PT due to feeling tired and wanting to rest. Upon return in p.m., pt was agreeable. With [facility] raised, pt required two person assist for transfer supine>sitting EOB. In sitting, R posterior lean noted, CGA for safety. pt c/o feeling dizzy throughout session; BP was assessed and remained stable, see above. RN was notified/aware. Sit<>staand x 2 performed up to RW; first trial pt demo'd good adherence to TTWB L LE, R lean noted. More fatigue noted with second trial. Due to pt's c/o dizziness, pt deferred transfer to chair; was assisted back to supine. Pt demonstrated improved ability to maintain TTWB to LLE today. Further skilled PT is recommended in hospital setting; continue dc recs below.       Discharge Recommendations   Recommendation for Discharge: PT IL: Patient is appropriate for daily Physical Therapy                       Progress: slow progress, decreased activity tolerance    â¢ Skilled therapy is required to address these bilaterally.  Cardiovascular: Regular rate and rhythm, Early peaking systolic murmur over the right upper sternal border, No bilateral lower extremity edema.  Pulmonary: Normal respiratory effort, normal lung sounds, no wheezing.  Abdominal: Soft, nontender, no hepatosplenomegaly, liver is non-pulsatile.  Neurological: Alert and orient x 3.   Skin: Warm, dry, no ecchymosis, no rash.  Psych: Appropriate mood and affect. Normal judgment and insight.      Lab Results   Component Value Date     01/13/2020     05/08/2019    K 4.4 01/13/2020    K 4.5 05/08/2019     01/13/2020     05/08/2019    CO2 22 01/13/2020    CO2 26 05/08/2019    BUN 12 01/13/2020    BUN 13 05/08/2019    CREATININE 0.8 01/13/2020    CREATININE 0.9 05/08/2019    CALCIUM 8.7 01/13/2020    CALCIUM 9.1 05/08/2019    ALBUMIN 3.9 01/13/2020    ALBUMIN 4.3 05/08/2019    BILITOT 0.4 01/13/2020    BILITOT 0.2 05/08/2019    AST 20 01/13/2020    AST 23 05/08/2019    ALT 18 01/13/2020    ALT 22 05/08/2019     Lab Results   Component Value Date    WBC 7.64 08/26/2019    WBC 7.2 05/20/2019    HGB 15.9 08/26/2019    HGB 13.7 05/20/2019    HCT 49.3 08/26/2019    HCT 49.0 08/26/2019    MCV 89.6 08/26/2019    MCV 82.7 05/20/2019     08/26/2019     05/20/2019     Lab Results   Component Value Date    TRIG 70 01/13/2020    TRIG 67 01/16/2019    HDL 35 (L) 01/13/2020    HDL 39 (L) 01/16/2019    LDL 88 01/13/2020     (H) 01/16/2019     No results found for: PROBNP, BNP  No results found for: CKTOTAL, CKMB, CKMBINDEX, TROPONINI, TROPONINT  No results found for: TSH      CMP 1/13/2021:  Sodium 141  Potassium 4.2  Chloride 103  CO2 22  BUN 11  Creatinine 0.95  Alk phos 199  AST 21  ALT 21    CBC 1/12/2021:  WBC 5.8  Hemoglobin 15.2  Hematocrit 48.8  Platelets 279    Lipid panel 1/12/2021:  Total cholesterol 136  Triglycerides 68  HDL 31  LDL 91    TSH 10/9/2020:  1.9      ECG 12 Lead    Date/Time: 4/22/2021 8:43 AM  Performed by:  limitations in attempt to maximize the patient's independence. Education:   Learning Assessment:  - Primary learner: patient  - Are they ready to learn: yes  - Preferred learning style: verbal  - Barriers to learning: no barriers apparent at this time  Education provided during session:  - Receiving Education: patient  - Results of above outlined education: Verbalizes understanding    Patient at End of Session:   Location: in bed  Safety measures: alarm system in place/re-engaged, call light within reach, equipment intact, lines intact and bed rails x2  Handoff to: nurse    PLAN   Suggestions for next session as indicated: PT Frequency: 6-7 x per week  Frequency Comments: *Ortho 3/6 last 11/16 (R) NBA, MB, GH, AL, MWJ *A*      Interventions: balance, bed mobility, gait training, strengthening, safety education, functional transfer training and stairs retraining  Agreement to plan and goals: patient agrees with goals and treatment plan        GOALS  Review Date: 11/9/2022  Long Term Goals: (to be met by time of discharge from hospital)  State precautions: Patient able to state precautions independent. Status: progressing/ongoing  Maintain precautions: Patient able to maintain precautions independent. Status: progressing/ongoing  Sit to supine: Patient will complete sit to supine modified independent. Status: progressing/ongoing  Supine to sit: Patient will complete supine to sit modified independent. Status: progressing/ongoing  Sit to stand: Patient will complete sit to stand transfer with gait belt, contact guard or touching/steadying. Status: progressing/ongoing  Stand pivot: Patient will complete stand pivot transfer with gait belt, contact guard or touching/steadying. Status: progressing/ongoing  Ambulation (even): Patient will ambulate on even surface for 10 feet with gait belt and 2-wheeled walker, contact guard or touching/steadying and following weight bearing status.    Status: Ramy Stoner MD  Authorized by: Ramy Stoner MD   Previous ECG: no previous ECG available  Rhythm: sinus rhythm  Conduction: left posterior fascicular block    Clinical impression: non-specific ECG              Assessment:          Diagnosis Plan   1. Palpitations  ECG 12 Lead    Adult Transthoracic Echo Complete W/ Cont if Necessary Per Protocol    Cardiac Event Monitor   2. History of DVT (deep vein thrombosis)     3. Heart murmur  Adult Transthoracic Echo Complete W/ Cont if Necessary Per Protocol          Plan:       Mr. Ventura is a 76 y.o. gentleman with past medical history notable for provoked DVT in the setting of knee surgery who presents to our office for initial valuation regarding episodes of palpitations and lightheadedness.  Overall patient symptoms are hard to pin down.  This could simply be APCs or sinus tachycardia versus paroxysmal atrial fibrillation.  Nonetheless I would recommend a 2-week event monitor to better define his underlying rhythm.  Given his heart murmur on exam and new palpitations I would also recommend an echocardiogram.  Recent lab work by his primary care physician did not demonstrate any overt abnormalities in his thyroid function or electrolyte abnormalities.  We will follow up on his cardiac test results and decide if any further changes are needed with his medical regimen.  His blood pressure is borderline today and may consider adding a beta-blocker to see if this will help out with symptoms based upon test results.    Palpitations:  · CMP 1/2021 demonstrates normal sodium and potassium  · TSH 10/2020 demonstrates normal levels  · We will follow up on event monitor  · We will follow up on echocardiogram    Heart murmur:  · We will follow up on echocardiogram    History of DVT:  · DVT was provoked in the setting of knee surgery  · Continue aspirin 81 mg    Follow-up:  2 months    Thank you for allowing me to participate in the care of Aniket Ventura. Feel free to  progressing/ongoing    Documented in the chart in the following areas: Prior Level of Function. Assessment.       Therapy procedure time and total treatment time can be found documented on the Time Entry flowsheet contact me directly with any further questions or concerns.    Ramy Stoner MD  [facility]  04/22/21  09:04 EDT

## 2022-12-16 ENCOUNTER — TRANSCRIBE ORDERS (OUTPATIENT)
Dept: ADMINISTRATIVE | Facility: HOSPITAL | Age: 77
End: 2022-12-16

## 2022-12-16 DIAGNOSIS — R00.2 PALPITATIONS: Primary | ICD-10-CM

## 2022-12-27 ENCOUNTER — HOSPITAL ENCOUNTER (OUTPATIENT)
Dept: RESPIRATORY THERAPY | Facility: HOSPITAL | Age: 77
Discharge: HOME | End: 2022-12-27
Admitting: INTERNAL MEDICINE

## 2022-12-27 DIAGNOSIS — R00.2 PALPITATIONS: ICD-10-CM

## 2022-12-27 PROCEDURE — 93270 REMOTE 30 DAY ECG REV/REPORT: CPT

## 2023-01-27 PROCEDURE — 93272 ECG/REVIEW INTERPRET ONLY: CPT | Performed by: INTERNAL MEDICINE
